# Patient Record
Sex: MALE | Race: ASIAN | NOT HISPANIC OR LATINO | URBAN - METROPOLITAN AREA
[De-identification: names, ages, dates, MRNs, and addresses within clinical notes are randomized per-mention and may not be internally consistent; named-entity substitution may affect disease eponyms.]

---

## 2023-05-04 ENCOUNTER — APPOINTMENT (RX ONLY)
Dept: URBAN - METROPOLITAN AREA CLINIC 74 | Facility: CLINIC | Age: 23
Setting detail: DERMATOLOGY
End: 2023-05-04

## 2023-05-04 VITALS — WEIGHT: 165 LBS | HEIGHT: 68 IN

## 2023-05-04 DIAGNOSIS — L21.8 OTHER SEBORRHEIC DERMATITIS: ICD-10-CM | Status: INADEQUATELY CONTROLLED

## 2023-05-04 DIAGNOSIS — L20.89 OTHER ATOPIC DERMATITIS: ICD-10-CM | Status: STABLE

## 2023-05-04 PROCEDURE — ? FULL BODY SKIN EXAM - DECLINED

## 2023-05-04 PROCEDURE — ? PRESCRIPTION MEDICATION MANAGEMENT

## 2023-05-04 PROCEDURE — 99204 OFFICE O/P NEW MOD 45 MIN: CPT

## 2023-05-04 PROCEDURE — ? MDM - TREATMENT GOALS

## 2023-05-04 PROCEDURE — ? PRESCRIPTION

## 2023-05-04 PROCEDURE — ? COUNSELING

## 2023-05-04 RX ORDER — KETOCONAZOLE 20 MG/ML
SHAMPOO, SUSPENSION TOPICAL BIW
Qty: 120 | Refills: 6 | Status: ERX | COMMUNITY
Start: 2023-05-04

## 2023-05-04 RX ORDER — FLUOCINOLONE ACETONIDE 0.11 MG/ML
OIL TOPICAL TIW
Qty: 118.28 | Refills: 3 | Status: ERX | COMMUNITY
Start: 2023-05-04

## 2023-05-04 RX ADMIN — FLUOCINOLONE ACETONIDE: 0.11 OIL TOPICAL at 00:00

## 2023-05-04 RX ADMIN — KETOCONAZOLE: 20 SHAMPOO, SUSPENSION TOPICAL at 00:00

## 2023-05-04 ASSESSMENT — LOCATION SIMPLE DESCRIPTION DERM
LOCATION SIMPLE: SCALP
LOCATION SIMPLE: RIGHT ELBOW
LOCATION SIMPLE: LEFT ELBOW

## 2023-05-04 ASSESSMENT — LOCATION ZONE DERM
LOCATION ZONE: SCALP
LOCATION ZONE: ARM

## 2023-05-04 ASSESSMENT — LOCATION DETAILED DESCRIPTION DERM
LOCATION DETAILED: RIGHT SUPERIOR PARIETAL SCALP
LOCATION DETAILED: LEFT ANTECUBITAL SKIN
LOCATION DETAILED: RIGHT ANTECUBITAL SKIN

## 2023-06-02 ENCOUNTER — APPOINTMENT (RX ONLY)
Dept: URBAN - METROPOLITAN AREA CLINIC 74 | Facility: CLINIC | Age: 23
Setting detail: DERMATOLOGY
End: 2023-06-02

## 2023-06-02 VITALS — HEIGHT: 68 IN | WEIGHT: 165 LBS

## 2023-06-02 DIAGNOSIS — L73.9 FOLLICULAR DISORDER, UNSPECIFIED: ICD-10-CM | Status: INADEQUATELY CONTROLLED

## 2023-06-02 DIAGNOSIS — L21.8 OTHER SEBORRHEIC DERMATITIS: ICD-10-CM | Status: STABLE

## 2023-06-02 DIAGNOSIS — Z41.9 ENCOUNTER FOR PROCEDURE FOR PURPOSES OTHER THAN REMEDYING HEALTH STATE, UNSPECIFIED: ICD-10-CM

## 2023-06-02 DIAGNOSIS — L738 OTHER SPECIFIED DISEASES OF HAIR AND HAIR FOLLICLES: ICD-10-CM | Status: INADEQUATELY CONTROLLED

## 2023-06-02 DIAGNOSIS — L20.89 OTHER ATOPIC DERMATITIS: ICD-10-CM

## 2023-06-02 DIAGNOSIS — L663 OTHER SPECIFIED DISEASES OF HAIR AND HAIR FOLLICLES: ICD-10-CM | Status: INADEQUATELY CONTROLLED

## 2023-06-02 PROBLEM — L02.821 FURUNCLE OF HEAD [ANY PART, EXCEPT FACE]: Status: ACTIVE | Noted: 2023-06-02

## 2023-06-02 PROCEDURE — ? PRODUCT LINE (ZO)

## 2023-06-02 PROCEDURE — ? PRESCRIPTION MEDICATION MANAGEMENT

## 2023-06-02 PROCEDURE — ? MDM - TREATMENT GOALS

## 2023-06-02 PROCEDURE — ? PRESCRIPTION

## 2023-06-02 PROCEDURE — ? FULL BODY SKIN EXAM - DECLINED

## 2023-06-02 PROCEDURE — ? COUNSELING

## 2023-06-02 PROCEDURE — 99214 OFFICE O/P EST MOD 30 MIN: CPT

## 2023-06-02 RX ORDER — CLINDAMYCIN PHOSPHATE 10 MG/G
GEL TOPICAL BID
Qty: 30 | Refills: 3 | Status: ERX | COMMUNITY
Start: 2023-06-02

## 2023-06-02 RX ADMIN — CLINDAMYCIN PHOSPHATE: 10 GEL TOPICAL at 00:00

## 2023-06-02 ASSESSMENT — LOCATION SIMPLE DESCRIPTION DERM
LOCATION SIMPLE: RIGHT ELBOW
LOCATION SIMPLE: SCALP
LOCATION SIMPLE: LEFT ELBOW

## 2023-06-02 ASSESSMENT — LOCATION ZONE DERM
LOCATION ZONE: ARM
LOCATION ZONE: SCALP

## 2023-06-02 ASSESSMENT — LOCATION DETAILED DESCRIPTION DERM
LOCATION DETAILED: RIGHT ANTECUBITAL SKIN
LOCATION DETAILED: LEFT ANTECUBITAL SKIN
LOCATION DETAILED: RIGHT SUPERIOR PARIETAL SCALP
LOCATION DETAILED: LEFT CENTRAL PARIETAL SCALP

## 2023-06-02 NOTE — PROCEDURE: MDM - TREATMENT GOALS
Treatment Goal Explanation (Does Not Render In The Note): Stable for the purposes of categorizing medical decision making is defined by the specific treatment goals for an individual patient. A patient that is not at their treatment goal is not stable, even if the condition has not changed and there is no short- term threat to life or function.
Treatment Goal Met?: yes
Treatment Goal Met?: no

## 2023-06-02 NOTE — PROCEDURE: PRESCRIPTION MEDICATION MANAGEMENT
Render In Strict Bullet Format?: No
Detail Level: Zone
Continue Regimen: Fluocinolone 0.1% scalp oil Apply nightly to scalp overnight for flare, then taper as starts to improve\\n\\nKetoconozole 2% shampoo apply, lather rinse three times per week
Initiate Treatment: Clindamycin 1% gel Apply to affected area once to twice daily for flare
Plan: Consider oral antibiotics (Doxycycline 100 mg po BID) if persists.
Initiate Treatment: Restart Fluocinolone 0.1% oil apply twice daily for flare, then taper as starts to improve over 14 days
Continue Regimen: Hypoallergenic cleanser and moisturizer

## 2023-06-02 NOTE — PROCEDURE: MIPS QUALITY
Quality 130: Documentation Of Current Medications In The Medical Record: Current Medications Documented
Quality 486: Dermatitis - Improvement In Patient-Reported Itch Severity: Itch severity assessment score is reduced by 2 or more points from the initial (index) assessment score to the follow-up visit score
Quality 431: Preventive Care And Screening: Unhealthy Alcohol Use - Screening: Patient not identified as an unhealthy alcohol user when screened for unhealthy alcohol use using a systematic screening method
Detail Level: Detailed
Quality 226: Preventive Care And Screening: Tobacco Use: Screening And Cessation Intervention: Patient screened for tobacco use and is an ex/non-smoker

## 2023-06-02 NOTE — PROCEDURE: PRODUCT LINE (ZO)
Name Of Product 30: ZO pore refiner
Product 32 Units: 0
Product 14 Price (In Dollars - Numeric Only, No Special Characters Or $): 220
Product 50 Price (In Dollars - Numeric Only, No Special Characters Or $): 0.00
Product 11 Application Directions: Apply as directed with Exfoliating cleanser, exfoliating polish, complexion renewal pads, daily power defense
Product 47 Application Directions: Apply, massage skin, rinse twice weekly, increase to daily as tolerated.
Product 39 Application Directions: Apply every 2 hours as needed
Product 3 Application Directions: Apply nightly
Product 47 Units: 1
Name Of Product 9: ZO Rozatrol serum
Name Of Product 35: Enzyme Peel
Product 19 Application Directions: Apply serum daily
Name Of Product 1: ZO growth factor serum
Name Of Product 4: Complexion pads
Product 32 Application Directions: Apply twice daiky
Product 45 Price (In Dollars - Numeric Only, No Special Characters Or $): 52
Product 42 Application Directions: Apply daily in am
Name Of Product 40: Recovery cream
Product 25 Application Directions: Apply every other night, increase as tolerated
Name Of Product 17: Radical night repair
Name Of Product 23: ZO Pigment Control plus brightening creme
Product 6 Application Directions: Apply, lather rinse daily
Name Of Product 12: Anti-aging Program Kit
Product 14 Application Directions: Apply as directed with gentle cleanser, exfoliating polish, oil control pads, daily power defense and rozatrol
Product 30 Price (In Dollars - Numeric Only, No Special Characters Or $): 66
Product 37 Application Directions: Apply twice a day as tolerated
Product 9 Price (In Dollars - Numeric Only, No Special Characters Or $): 88.00
Product 35 Price (In Dollars - Numeric Only, No Special Characters Or $): 72
Name Of Product 38: Renewal cream
Product 1 Price (In Dollars - Numeric Only, No Special Characters Or $): 152
Product 4 Price (In Dollars - Numeric Only, No Special Characters Or $): 51.00
Name Of Product 33: Oil control pads
Name Of Product 43: Illuminating AOX serum
Name Of Product 26: ZO sunscreen plus primer
Name Of Product 20: Aggressive anti-aging kit
Product 23 Price (In Dollars - Numeric Only, No Special Characters Or $): 131
Product 40 Price (In Dollars - Numeric Only, No Special Characters Or $): 115
Detail Level: Zone
Product 9 Application Directions: Apply qd
Product 30 Application Directions: Apply daily to affected areas
Product 20 Price (In Dollars - Numeric Only, No Special Characters Or $): 263
Name Of Product 15: Refissa
Name Of Product 7: ZO Power defense
Product 45 Application Directions: Apply, lather, rinse daily -twice daily
Product 12 Price (In Dollars - Numeric Only, No Special Characters Or $): 213
Product 28 Application Directions: Apply twice daily
Product 17 Application Directions: Apply nightly as tolerated
Product 43 Price (In Dollars - Numeric Only, No Special Characters Or $): 165
Product 23 Application Directions: Apply daily
Product 7 Price (In Dollars - Numeric Only, No Special Characters Or $): 150
Product 33 Price (In Dollars - Numeric Only, No Special Characters Or $): 62
Product 35 Application Directions: Apply weekly
Product 15 Price (In Dollars - Numeric Only, No Special Characters Or $): 70
Name Of Product 31: Exfoliating polish
Product 12 Application Directions: Apply as directed Exfoliating cleanser, exfoliating polish, complexion renewal pads, daily power defense, growth factor serum
Name Of Product 36: ZO intense eye cream
Product 1 Application Directions: Apply every night
Product 4 Application Directions: Apply every other day, increase to daily as tolerated
Name Of Product 29: Retinol skin brightener
Name Of Product 46: Firming serum
Name Of Product 10: Complexion treatment kit
Name Of Product 2: ZO Exfoliating Polish
Product 20 Application Directions: Apply hydrating cleanser, exfoliating polish, complexion renewal pads, daily power defense, radical night repair, renewal creme as directed
Product 33 Application Directions: Apply daily as tolerated
Risk Of Complication Category: Low (OTC Medications)
Name Of Product 41: ZO body emulsion
Assigning Risk Information: Per AMA, level of risk is based upon consequences of the problem(s) addressed at the encounter when appropriately treated. Risk also includes medical decision making related to the need to initiate or forego further testing, treatment and/or hospitalization. Over the counter medication are assigned a risk level of low. Prescription medication management is assigned a risk level of moderate.
Name Of Product 18: Pigment Control program + Hydroquinone
Name Of Product 24: ZO Exfoliating cleanser
Product 7 Application Directions: Apply morning and night
Product 18 Price (In Dollars - Numeric Only, No Special Characters Or $): 182
Name Of Product 13: Skin brightening program +texture repair
Product 36 Price (In Dollars - Numeric Only, No Special Characters Or $): 130
Name Of Product 5: ZO Calming Toner
Product 26 Application Directions: Apply daily, reapply as needed
Product 10 Price (In Dollars - Numeric Only, No Special Characters Or $): 140
Product 31 Price (In Dollars - Numeric Only, No Special Characters Or $): 67
Product 43 Application Directions: Apply to face every am
Product 21 Application Directions: Apply to affected area morning and night
Product 5 Price (In Dollars - Numeric Only, No Special Characters Or $): 37.00
Name Of Product 34: Tretinoin .05% cream
Name Of Product 44: Hydrating cleanser
Allow Plan To Count Towards E/M Coding: No (this will remove associated impression from E/M calculation)
Name Of Product 27: Dual action scrub
Product 41 Price (In Dollars - Numeric Only, No Special Characters Or $): 90
Name Of Product 21: Pigment control creme
Product 24 Price (In Dollars - Numeric Only, No Special Characters Or $): 45
Product 10 Application Directions: Follow kit directions with Exfoliating cleanser, exfoliating polish, complexion renewal pads, sulfur masque
Name Of Product 8: ZO Eye brightening creme
Product 13 Price (In Dollars - Numeric Only, No Special Characters Or $): 284
Product 36 Application Directions: Apply to eyelids QAM
Name Of Product 39: ZO light mineral powder
Product 18 Application Directions: Apply as directed with gentle cleanser, exfoliating polish, complexion renewal pads, daily power defense, pigment control creme, pigment control blending creme
Product 44 Price (In Dollars - Numeric Only, No Special Characters Or $): 46
Product 24 Application Directions: Apply lather rinse daily
Name Of Product 16: Complexion Renewal Pads
Name Of Product 22: Skin normalizing kit
Name Of Product 32: Remastered bright alive
Name Of Product 11: ZO Daily Skincare Kit
Product 13 Application Directions: Apply as directed with retinol skin brightened, bright alive skin brightener, wrinkle + texture repair and daily power defense
Product 41 Application Directions: Apply daily to body
Product 2 Application Directions: Apply, lather rinse nightly
Product 5 Application Directions: Apply with cotton round daily
Product 29 Price (In Dollars - Numeric Only, No Special Characters Or $): 120
Name Of Product 47: Travel size exfoliating polish
Send Charges To Patient Encounter: Yes
Name Of Product 3: ZO Pigment control cream + blending
Name Of Product 42: Zo vitamin C serum
Name Of Product 19: Growth factor eye serum
Name Of Product 37: Pigment control + blending cr?me
Name Of Product 25: Wrinkle and texture repair cream
Product 29 Application Directions: Apply every night as tolerated
Product 8 Application Directions: Apply daily to eyelids
Name Of Product 6: ZO gentle cleanser
Product 47 Price (In Dollars - Numeric Only, No Special Characters Or $): 22
Product 27 Application Directions: Apply, lather rinse twice weekly
Product 44 Application Directions: Apply, lather rinse twice daily
Product 11 Price (In Dollars - Numeric Only, No Special Characters Or $): 124
Product 22 Application Directions: Apply as directed
Name Of Product 45: ZO balancing cleansing emulsion
Name Of Product 28: Hydrating cream
Product 16 Application Directions: Apply daily post cleansing
Render Product Pricing In Note: No
Product 25 Price (In Dollars - Numeric Only, No Special Characters Or $): 145